# Patient Record
Sex: MALE | Race: WHITE | Employment: FULL TIME | URBAN - METROPOLITAN AREA
[De-identification: names, ages, dates, MRNs, and addresses within clinical notes are randomized per-mention and may not be internally consistent; named-entity substitution may affect disease eponyms.]

---

## 2017-08-04 ENCOUNTER — HOSPITAL ENCOUNTER (OUTPATIENT)
Age: 42
Discharge: HOME | End: 2017-08-04
Attending: PSYCHIATRY & NEUROLOGY
Payer: SELF-PAY

## 2017-08-04 DIAGNOSIS — H53.123 TRANSIENT VISUAL LOSS, BILATERAL: ICD-10-CM

## 2017-08-04 PROCEDURE — 74011250636 HC RX REV CODE- 250/636

## 2017-08-04 PROCEDURE — A9585 GADOBUTROL INJECTION: HCPCS

## 2017-08-04 PROCEDURE — 70553 MRI BRAIN STEM W/O & W/DYE: CPT

## 2017-08-04 RX ADMIN — GADOBUTROL 10 ML: 604.72 INJECTION INTRAVENOUS at 21:00

## 2018-06-25 ENCOUNTER — OFFICE VISIT (OUTPATIENT)
Dept: INTERNAL MEDICINE CLINIC | Age: 43
End: 2018-06-25

## 2018-06-25 VITALS
TEMPERATURE: 98.3 F | OXYGEN SATURATION: 97 % | HEART RATE: 72 BPM | SYSTOLIC BLOOD PRESSURE: 126 MMHG | WEIGHT: 211 LBS | DIASTOLIC BLOOD PRESSURE: 86 MMHG | HEIGHT: 71 IN | BODY MASS INDEX: 29.54 KG/M2

## 2018-06-25 DIAGNOSIS — R07.9 CHEST PAIN, UNSPECIFIED TYPE: Primary | ICD-10-CM

## 2018-06-25 RX ORDER — SUMATRIPTAN 25 MG/1
TABLET, FILM COATED ORAL
Refills: 0 | COMMUNITY
Start: 2018-04-02 | End: 2019-07-18 | Stop reason: ALTCHOICE

## 2018-06-25 RX ORDER — PANTOPRAZOLE SODIUM 40 MG/1
TABLET, DELAYED RELEASE ORAL
Qty: 30 TAB | Refills: 3 | Status: SHIPPED | OUTPATIENT
Start: 2018-06-25 | End: 2019-06-17 | Stop reason: ALTCHOICE

## 2018-06-25 RX ORDER — TOPIRAMATE 50 MG/1
TABLET, FILM COATED ORAL DAILY
Refills: 0 | COMMUNITY
Start: 2018-06-04 | End: 2019-08-15 | Stop reason: SDUPTHER

## 2018-06-26 NOTE — PROGRESS NOTES
The patient presents to the office today with the chief complaint of chest pain    HPI    The patient complains of epigastric to lower chest pain. Not exertional.  Pain is an ache. The patient has a history of reflux - although there is some radiation up his chest this pain is somewhat different. There is no associated dyspnea. The patient continues with cigarette use      Review of Systems   Respiratory: Negative for shortness of breath. Cardiovascular: Positive for chest pain. Negative for leg swelling. Allergies   Allergen Reactions    Erythromycin Anaphylaxis and Hives       Current Outpatient Prescriptions   Medication Sig Dispense Refill    topiramate (TOPAMAX) 50 mg tablet   0    SUMAtriptan (IMITREX) 25 mg tablet take 1 to 2 tablets by mouth every 6 hours if needed for ACUTE MIGRAINE  0    pantoprazole (PROTONIX) 40 mg tablet 1 tab each day 30 Tab 3    fluticasone (FLONASE) 50 mcg/actuation nasal spray nightly. Past Medical History:   Diagnosis Date    Broken leg     Bronchitis     Concussion     x3    Dislocated shoulder     Hernia     Pulmonary edema     Sterilization     Upper respiratory infection, acute        Past Surgical History:   Procedure Laterality Date    HX HERNIA REPAIR  1994    HX ORTHOPAEDIC  1980    broken left leg     HX OTHER SURGICAL      19 sutures on face    HX VASECTOMY  4/24/2015    Dr. Ronen Castillo History     Social History    Marital status: SINGLE     Spouse name: N/A    Number of children: N/A    Years of education: N/A     Occupational History    Not on file.      Social History Main Topics    Smoking status: Current Every Day Smoker     Packs/day: 0.75    Smokeless tobacco: Not on file    Alcohol use No    Drug use: Not on file    Sexual activity: Not on file     Other Topics Concern    Not on file     Social History Narrative       Patient does not have an advanced directive on file    Visit Vitals    /86 (BP 1 Location: Left arm, BP Patient Position: Standing)    Pulse 72    Temp 98.3 °F (36.8 °C) (Tympanic)    Ht 5' 11\" (1.803 m)    Wt 211 lb (95.7 kg)    SpO2 97%    BMI 29.43 kg/m2       Physical Exam  No Cervical Lymphadenopathy  No Supraclavicular Lymphadenopathy  Thyroid is Normal  Lungs are normal to percussion. Clear to auscultation   Chest wall with mild tenderness  Heart:  S1 S2 are normal, No gallops, No mummers  No Carotid Bruits  Abdomen:  Normal Bowel Sounds. No tenderness. No masses. No Hepatomegaly or Splenomegly  LE:  Strong Pedal Pulses. No Edema    . Results for orders placed or performed in visit on 06/25/18   AMB POC EKG ROUTINE W/ 12 LEADS, INTER & REP    Impression    Impression:  Normal EKG       Assessment / Plan      ICD-10-CM ICD-9-CM    1. Chest pain, unspecified type R07.9 786.50 AMB POC EKG ROUTINE W/ 12 LEADS, INTER & REP      NUCLEAR CARDIAC STRESS TEST       Stress Nuclear Test  Protonix 40 mg each AM  To ER if severe or prolonged chest pain    Follow-up Disposition:  Return in about 4 months (around 10/25/2018). I asked Mike Celis if he has any questions and I answered the questions. Mike Celis states that he understands the treatment plan and agrees with the treatment plan

## 2018-07-30 ENCOUNTER — HOSPITAL ENCOUNTER (OUTPATIENT)
Dept: NON INVASIVE DIAGNOSTICS | Age: 43
Discharge: HOME | End: 2018-07-30
Attending: INTERNAL MEDICINE
Payer: COMMERCIAL

## 2018-07-30 VITALS
SYSTOLIC BLOOD PRESSURE: 120 MMHG | WEIGHT: 211 LBS | HEIGHT: 67 IN | BODY MASS INDEX: 33.12 KG/M2 | DIASTOLIC BLOOD PRESSURE: 80 MMHG

## 2018-07-30 DIAGNOSIS — R07.9 CHEST PAIN, UNSPECIFIED TYPE: ICD-10-CM

## 2018-07-30 LAB
NUC REST EJECTION FRACTION: 65 %
STRESS BASELINE DIAS BP: 80 MMHG
STRESS BASELINE HR: 67 BPM
STRESS BASELINE SYS BP: 120 MMHG
STRESS ESTIMATED WORKLOAD: 13.4 METS
STRESS EXERCISE DUR MIN: NORMAL
STRESS PEAK DIAS BP: 70 MMHG
STRESS PEAK SYS BP: 180 MMHG
STRESS PERCENT HR ACHIEVED: 114 %
STRESS POST PEAK HR: 171 BPM
STRESS RATE PRESSURE PRODUCT: NORMAL BPM*MMHG
STRESS ST DEPRESSION: 0 MM
STRESS ST ELEVATION: 0 MM
STRESS TARGET HR: 150 BPM
TID: 1.03

## 2018-07-30 PROCEDURE — A9500 TC99M SESTAMIBI: HCPCS

## 2018-07-30 PROCEDURE — 74011000258 HC RX REV CODE- 258: Performed by: INTERNAL MEDICINE

## 2018-07-30 RX ORDER — SODIUM CHLORIDE 9 MG/ML
100 INJECTION, SOLUTION INTRAVENOUS ONCE
Status: COMPLETED | OUTPATIENT
Start: 2018-07-30 | End: 2018-07-30

## 2018-07-30 RX ADMIN — SODIUM CHLORIDE 100 ML/HR: 900 INJECTION, SOLUTION INTRAVENOUS at 09:15

## 2018-07-30 NOTE — PROGRESS NOTES
Patient was given 11.0 milliCuries of 99mTc-Sestamibi for the resting images. Patient was also given 33.0 milliCuries of 99mTc-Sestamibi for the stress images. Prone images were acquired. Patient walked on treadmill during nuclear stress test. 
 
Patient's armband was discarded and shredded.

## 2019-06-17 ENCOUNTER — HOSPITAL ENCOUNTER (OUTPATIENT)
Dept: LAB | Age: 44
Discharge: HOME | End: 2019-06-17
Payer: COMMERCIAL

## 2019-06-17 ENCOUNTER — OFFICE VISIT (OUTPATIENT)
Dept: FAMILY MEDICINE CLINIC | Facility: CLINIC | Age: 44
End: 2019-06-17

## 2019-06-17 VITALS
RESPIRATION RATE: 12 BRPM | SYSTOLIC BLOOD PRESSURE: 112 MMHG | DIASTOLIC BLOOD PRESSURE: 76 MMHG | TEMPERATURE: 96.8 F | BODY MASS INDEX: 35.82 KG/M2 | OXYGEN SATURATION: 99 % | WEIGHT: 228.2 LBS | HEIGHT: 67 IN | HEART RATE: 81 BPM

## 2019-06-17 DIAGNOSIS — F43.9 STRESS: ICD-10-CM

## 2019-06-17 DIAGNOSIS — Z00.00 ANNUAL PHYSICAL EXAM: ICD-10-CM

## 2019-06-17 DIAGNOSIS — Z00.00 ANNUAL PHYSICAL EXAM: Primary | ICD-10-CM

## 2019-06-17 DIAGNOSIS — R53.82 CHRONIC FATIGUE: ICD-10-CM

## 2019-06-17 LAB
ALBUMIN SERPL-MCNC: 4.1 G/DL (ref 3.4–5)
ALBUMIN/GLOB SERPL: 1.3 {RATIO} (ref 0.8–1.7)
ALP SERPL-CCNC: 67 U/L (ref 45–117)
ALT SERPL-CCNC: 36 U/L (ref 16–61)
ANION GAP SERPL CALC-SCNC: 10 MMOL/L (ref 3–18)
AST SERPL-CCNC: 15 U/L (ref 15–37)
BASOPHILS # BLD: 0.1 K/UL (ref 0–0.1)
BASOPHILS NFR BLD: 1 % (ref 0–2)
BILIRUB SERPL-MCNC: 0.6 MG/DL (ref 0.2–1)
BUN SERPL-MCNC: 10 MG/DL (ref 7–18)
BUN/CREAT SERPL: 12 (ref 12–20)
CALCIUM SERPL-MCNC: 9 MG/DL (ref 8.5–10.1)
CHLORIDE SERPL-SCNC: 107 MMOL/L (ref 100–108)
CHOLEST SERPL-MCNC: 225 MG/DL
CO2 SERPL-SCNC: 24 MMOL/L (ref 21–32)
CREAT SERPL-MCNC: 0.83 MG/DL (ref 0.6–1.3)
DIFFERENTIAL METHOD BLD: ABNORMAL
EOSINOPHIL # BLD: 0.3 K/UL (ref 0–0.4)
EOSINOPHIL NFR BLD: 3 % (ref 0–5)
ERYTHROCYTE [DISTWIDTH] IN BLOOD BY AUTOMATED COUNT: 14.4 % (ref 11.6–14.5)
GLOBULIN SER CALC-MCNC: 3.1 G/DL (ref 2–4)
GLUCOSE SERPL-MCNC: 74 MG/DL (ref 74–99)
HCT VFR BLD AUTO: 44.1 % (ref 36–48)
HGB BLD-MCNC: 14.7 G/DL (ref 13–16)
LYMPHOCYTES # BLD: 3.3 K/UL (ref 0.9–3.6)
LYMPHOCYTES NFR BLD: 40 % (ref 21–52)
MCH RBC QN AUTO: 31.3 PG (ref 24–34)
MCHC RBC AUTO-ENTMCNC: 33.3 G/DL (ref 31–37)
MCV RBC AUTO: 94 FL (ref 74–97)
MONOCYTES # BLD: 0.7 K/UL (ref 0.05–1.2)
MONOCYTES NFR BLD: 8 % (ref 3–10)
NEUTS SEG # BLD: 3.8 K/UL (ref 1.8–8)
NEUTS SEG NFR BLD: 48 % (ref 40–73)
PLATELET # BLD AUTO: 298 K/UL (ref 135–420)
PMV BLD AUTO: 12.5 FL (ref 9.2–11.8)
POTASSIUM SERPL-SCNC: 4.3 MMOL/L (ref 3.5–5.5)
PROT SERPL-MCNC: 7.2 G/DL (ref 6.4–8.2)
RBC # BLD AUTO: 4.69 M/UL (ref 4.7–5.5)
SODIUM SERPL-SCNC: 141 MMOL/L (ref 136–145)
TSH SERPL DL<=0.05 MIU/L-ACNC: 5.44 UIU/ML (ref 0.36–3.74)
WBC # BLD AUTO: 8.1 K/UL (ref 4.6–13.2)

## 2019-06-17 PROCEDURE — 80053 COMPREHEN METABOLIC PANEL: CPT

## 2019-06-17 PROCEDURE — 84443 ASSAY THYROID STIM HORMONE: CPT

## 2019-06-17 PROCEDURE — 36415 COLL VENOUS BLD VENIPUNCTURE: CPT

## 2019-06-17 PROCEDURE — 82465 ASSAY BLD/SERUM CHOLESTEROL: CPT

## 2019-06-17 PROCEDURE — 85025 COMPLETE CBC W/AUTO DIFF WBC: CPT

## 2019-06-17 RX ORDER — BUPROPION HYDROCHLORIDE 100 MG/1
TABLET, EXTENDED RELEASE ORAL
Qty: 60 TAB | Refills: 2 | Status: SHIPPED | OUTPATIENT
Start: 2019-06-17 | End: 2019-08-15 | Stop reason: SDUPTHER

## 2019-06-17 NOTE — PROGRESS NOTES
ROOM # 4    Lb Cancino presents today for   Chief Complaint   Patient presents with    Lethargy     Pt complains of low energy for the past month       Lb Cancino preferred language for health care discussion is english/other. Is someone accompanying this pt? no    Is the patient using any DME equipment during [address]? no    Depression Screening:  3 most recent PHQ Screens 6/17/2019 9/29/2015   Little interest or pleasure in doing things Several days Not at all   Feeling down, depressed, irritable, or hopeless Not at all Not at all   Total Score PHQ 2 1 0       Learning Assessment:  No flowsheet data found. Abuse Screening:  No flowsheet data found. Fall Risk  No flowsheet data found. Health Maintenance reviewed and discussed per provider. Yes    Lb Cancino is due for   Health Maintenance Due   Topic Date Due    Pneumococcal 0-64 years (1 of 1 - PPSV23) 08/05/1980    DTaP/Tdap/Td series (1 - Tdap) 08/05/1995    MEDICARE YEARLY EXAM  02/10/2019         Please order/place referral if appropriate. Advance Directive:  1. Do you have an advance directive in place? Patient Reply: no    2. If not, would you like material regarding how to put one in place? Patient Reply: no    Coordination of Care:  1. Have you been to the ER, urgent care clinic since your last visit? Hospitalized since your last visit? no    2. Have you seen or consulted any other health care providers outside of the [address] [place] [address] since your last visit? Include any pap smears or colon screening.  no

## 2019-06-21 NOTE — PROGRESS NOTES
The patient presents to the office today for an annual checkup    HPI    The patient presents today for an annual checkup. The patient has remained on Topamax for migraine per tension and Imitrex to treat migraines. Patient has been doing okay with his medications. The patient complains of bothersome fatigue. The patient relates this to a busy work schedule and having a lot to do around the house at home. The patient is both tired and a bit frustrated with her current state of his workload.       ROS  Positive for stress and fatigue  Negative for chest pain or shortness of breath    Allergies   Allergen Reactions    Erythromycin Anaphylaxis and Hives       Current Outpatient Medications   Medication Sig Dispense Refill    buPROPion SR (WELLBUTRIN SR) 100 mg SR tablet 1 tab each AM, 1 tab mid day 60 Tab 2    topiramate (TOPAMAX) 50 mg tablet   0    SUMAtriptan (IMITREX) 25 mg tablet take 1 to 2 tablets by mouth every 6 hours if needed for ACUTE MIGRAINE  0       Past Medical History:   Diagnosis Date    Broken leg     Bronchitis     Concussion     x3    Dislocated shoulder     Hernia     Pulmonary edema     Sterilization     Upper respiratory infection, acute        Past Surgical History:   Procedure Laterality Date    HX HERNIA REPAIR  1994    HX ORTHOPAEDIC  1980    broken left leg     HX OTHER SURGICAL      19 sutures on face    HX VASECTOMY  4/24/2015    Dr. Day Franco       Social History     Socioeconomic History    Marital status: SINGLE     Spouse name: Not on file    Number of children: Not on file    Years of education: Not on file    Highest education level: Not on file   Occupational History    Not on file   Social Needs    Financial resource strain: Not on file    Food insecurity:     Worry: Not on file     Inability: Not on file    Transportation needs:     Medical: Not on file     Non-medical: Not on file   Tobacco Use    Smoking status: Former Smoker     Packs/day: 0.75     Last attempt to quit: 3/17/2019     Years since quittin.2    Smokeless tobacco: Current User   Substance and Sexual Activity    Alcohol use: No    Drug use: Yes     Types: Marijuana    Sexual activity: Yes     Partners: Female   Lifestyle    Physical activity:     Days per week: Not on file     Minutes per session: Not on file    Stress: Not on file   Relationships    Social connections:     Talks on phone: Not on file     Gets together: Not on file     Attends Quaker service: Not on file     Active member of club or organization: Not on file     Attends meetings of clubs or organizations: Not on file     Relationship status: Not on file    Intimate partner violence:     Fear of current or ex partner: Not on file     Emotionally abused: Not on file     Physically abused: Not on file     Forced sexual activity: Not on file   Other Topics Concern    Not on file   Social History Narrative    Not on file       Patient does not have an advanced directive on file    Visit Vitals  /76   Pulse 81   Temp 96.8 °F (36 °C) (Oral)   Resp 12   Ht 5' 7\" (1.702 m)   Wt 228 lb 3.2 oz (103.5 kg)   SpO2 99%   BMI 35.74 kg/m²       Physical Exam  No Cervical Lymphadenopathy  No Supraclavicular Lymphadenopathy  Thyroid is Normal  Lungs are normal to percussion. Clear to auscultation   Heart:  S1 S2 are normal, No gallops, No murmurs  No Carotid Bruits  Abdomen:  Normal Bowel Sounds. No tenderness. No masses. No Hepatomegaly or Splenomegaly  LE:  Strong Pedal Pulses.   No Edema      BMI: The BMI is high recommended diet limited to 2000 hammad daily and 100 g carbohydrates daily    Hospital Outpatient Visit on 2019   Component Date Value Ref Range Status    WBC 2019 8.1  4.6 - 13.2 K/uL Final    RBC 2019 4.69* 4.70 - 5.50 M/uL Final    HGB 2019 14.7  13.0 - 16.0 g/dL Final    HCT 2019 44.1  36.0 - 48.0 % Final    MCV 2019 94.0  74.0 - 97.0 FL Final    [facility] 2019 31.3  24.0 - 34.0 PG Final    MCHC 06/17/2019 33.3  31.0 - 37.0 g/dL Final    RDW 06/17/2019 14.4  11.6 - 14.5 % Final    PLATELET 25/91/9355 369  135 - 420 K/uL Final    MPV 06/17/2019 12.5* 9.2 - 11.8 FL Final    NEUTROPHILS 06/17/2019 48  40 - 73 % Final    LYMPHOCYTES 06/17/2019 40  21 - 52 % Final    MONOCYTES 06/17/2019 8  3 - 10 % Final    EOSINOPHILS 06/17/2019 3  0 - 5 % Final    BASOPHILS 06/17/2019 1  0 - 2 % Final    ABS. NEUTROPHILS 06/17/2019 3.8  1.8 - 8.0 K/UL Final    ABS. LYMPHOCYTES 06/17/2019 3.3  0.9 - 3.6 K/UL Final    ABS. MONOCYTES 06/17/2019 0.7  0.05 - 1.2 K/UL Final    ABS. EOSINOPHILS 06/17/2019 0.3  0.0 - 0.4 K/UL Final    ABS. BASOPHILS 06/17/2019 0.1  0.0 - 0.1 K/UL Final    DF 06/17/2019 AUTOMATED    Final    Sodium 06/17/2019 141  136 - 145 mmol/L Final    Potassium 06/17/2019 4.3  3.5 - 5.5 mmol/L Final    Chloride 06/17/2019 107  100 - 108 mmol/L Final    CO2 06/17/2019 24  21 - 32 mmol/L Final    Anion gap 06/17/2019 10  3.0 - 18 mmol/L Final    Glucose 06/17/2019 74  74 - 99 mg/dL Final    BUN 06/17/2019 10  7.0 - 18 MG/DL Final    Creatinine 06/17/2019 0.83  0.6 - 1.3 MG/DL Final    BUN/Creatinine ratio 06/17/2019 12  12 - 20   Final    GFR est AA 06/17/2019 >60  >60 ml/min/1.73m2 Final    GFR est non-AA 06/17/2019 >60  >60 ml/min/1.73m2 Final    Comment: (NOTE)  Estimated GFR is calculated using the Modification of Diet in Renal   Disease (MDRD) Study equation, reported for both  Americans   (GFRAA) and non- Americans (GFRNA), and normalized to 1.73m2   body surface area. The physician must decide which value applies to   the patient. The MDRD study equation should only be used in   individuals age 25 or older. It has not been validated for the   following: pregnant women, patients with serious comorbid conditions,   or on certain medications, or persons with extremes of body size,   muscle mass, or nutritional status.       Calcium 06/17/2019 9.0  8.5 - 10.1 MG/DL Final    Bilirubin, total 06/17/2019 0.6  0.2 - 1.0 MG/DL Final    ALT (SGPT) 06/17/2019 36  16 - 61 U/L Final    AST (SGOT) 06/17/2019 15  15 - 37 U/L Final    Alk. phosphatase 06/17/2019 67  45 - 117 U/L Final    Protein, total 06/17/2019 7.2  6.4 - 8.2 g/dL Final    Albumin 06/17/2019 4.1  3.4 - 5.0 g/dL Final    Globulin 06/17/2019 3.1  2.0 - 4.0 g/dL Final    A-G Ratio 06/17/2019 1.3  0.8 - 1.7   Final    TSH 06/17/2019 5.44* 0.36 - 3.74 uIU/mL Final    Cholesterol, total 06/17/2019 225* <200 MG/DL Final       .  Results for orders placed or performed during the hospital encounter of 06/17/19   CBC WITH AUTOMATED DIFF   Result Value Ref Range    WBC 8.1 4.6 - 13.2 K/uL    RBC 4.69 (L) 4.70 - 5.50 M/uL    HGB 14.7 13.0 - 16.0 g/dL    HCT 44.1 36.0 - 48.0 %    MCV 94.0 74.0 - 97.0 FL    MCH 31.3 24.0 - 34.0 PG    MCHC 33.3 31.0 - 37.0 g/dL    RDW 14.4 11.6 - 14.5 %    PLATELET 246 786 - 876 K/uL    MPV 12.5 (H) 9.2 - 11.8 FL    NEUTROPHILS 48 40 - 73 %    LYMPHOCYTES 40 21 - 52 %    MONOCYTES 8 3 - 10 %    EOSINOPHILS 3 0 - 5 %    BASOPHILS 1 0 - 2 %    ABS. NEUTROPHILS 3.8 1.8 - 8.0 K/UL    ABS. LYMPHOCYTES 3.3 0.9 - 3.6 K/UL    ABS. MONOCYTES 0.7 0.05 - 1.2 K/UL    ABS. EOSINOPHILS 0.3 0.0 - 0.4 K/UL    ABS. BASOPHILS 0.1 0.0 - 0.1 K/UL    DF AUTOMATED     METABOLIC PANEL, COMPREHENSIVE   Result Value Ref Range    Sodium 141 136 - 145 mmol/L    Potassium 4.3 3.5 - 5.5 mmol/L    Chloride 107 100 - 108 mmol/L    CO2 24 21 - 32 mmol/L    Anion gap 10 3.0 - 18 mmol/L    Glucose 74 74 - 99 mg/dL    BUN 10 7.0 - 18 MG/DL    Creatinine 0.83 0.6 - 1.3 MG/DL    BUN/Creatinine ratio 12 12 - 20      GFR est AA >60 >60 ml/min/1.73m2    GFR est non-AA >60 >60 ml/min/1.73m2    Calcium 9.0 8.5 - 10.1 MG/DL    Bilirubin, total 0.6 0.2 - 1.0 MG/DL    ALT (SGPT) 36 16 - 61 U/L    AST (SGOT) 15 15 - 37 U/L    Alk.  phosphatase 67 45 - 117 U/L    Protein, total 7.2 6.4 - 8.2 g/dL Albumin 4.1 3.4 - 5.0 g/dL    Globulin 3.1 2.0 - 4.0 g/dL    A-G Ratio 1.3 0.8 - 1.7     TSH 3RD GENERATION   Result Value Ref Range    TSH 5.44 (H) 0.36 - 3.74 uIU/mL   CHOLESTEROL, TOTAL   Result Value Ref Range    Cholesterol, total 225 (H) <200 MG/DL       Assessment / Plan      ICD-10-CM ICD-9-CM    1. Annual physical exam Z00.00 V70.0 CBC WITH AUTOMATED DIFF      METABOLIC PANEL, COMPREHENSIVE      CHOLESTEROL, TOTAL   2. Chronic fatigue R53.82 780.79 TSH 3RD GENERATION   3. Stress F43.9 V62.89 CBC WITH AUTOMATED DIFF      METABOLIC PANEL, COMPREHENSIVE       Labs ordered  Add Wellbutrin  he was advised to continue his maintenance medications  I counseled the patient as to the stressors in his life      Follow-up and Dispositions    · Return in about 1 month (around 7/16/2019). I asked Orvan Hammans. Diggs if he has any questions and I answered the questions. Orvan Hammans. Diggs states that he understands the treatment plan and agrees with the treatment plan          THIS DOCUMENT WAS [id number].   IT MAY CONTAIN TRANSCRIPTION ERRORS

## 2019-07-16 ENCOUNTER — OFFICE VISIT (OUTPATIENT)
Dept: FAMILY MEDICINE CLINIC | Facility: CLINIC | Age: 44
End: 2019-07-16

## 2019-07-16 VITALS
HEIGHT: 67 IN | BODY MASS INDEX: 35.74 KG/M2 | OXYGEN SATURATION: 98 % | DIASTOLIC BLOOD PRESSURE: 80 MMHG | TEMPERATURE: 97.6 F | SYSTOLIC BLOOD PRESSURE: 140 MMHG | HEART RATE: 96 BPM

## 2019-07-16 DIAGNOSIS — F43.9 STRESS: ICD-10-CM

## 2019-07-16 DIAGNOSIS — G43.009 MIGRAINE WITHOUT AURA AND WITHOUT STATUS MIGRAINOSUS, NOT INTRACTABLE: Primary | ICD-10-CM

## 2019-07-16 NOTE — PROGRESS NOTES
Latoya Tarango presents today for   Chief Complaint   Patient presents with    Well Male    Mass     left arm pit       Latoya Fillers preferred language for health care discussion is english. Is someone accompanying this pt? no    Is the patient using any DME equipment during [address]? no    Depression Screening:  3 most recent PHQ Screens 6/17/2019   Little interest or pleasure in doing things Several days   Feeling down, depressed, irritable, or hopeless Not at all   Total Score PHQ 2 1       Learning Assessment:  No flowsheet data found. Abuse Screening:  No flowsheet data found. Health Maintenance reviewed and discussed and ordered per Provider. Health Maintenance Due   Topic Date Due    DTaP/Tdap/Td series (1 - Tdap) 08/05/1995    MEDICARE YEARLY EXAM  02/10/2019   . Latyoa Fillers is updated on all     Pt currently taking Antiplatelet therapy? no    Coordination of Care:  1. Have you been to the ER, urgent care clinic since your last visit? no  Hospitalized since your last visit? no    2. Have you seen or consulted any other health care providers outside of the [address] [place] [address] since your last visit? no Include any pap smears or colon screening.  no

## 2019-07-18 NOTE — PROGRESS NOTES
The patient presents to the office today with the chief complaint of migraines    HPI    The patient has a history of migraines. These had been quiet but recently the patient had a relapse with several migraines. The patient remains on Topamax for prophylaxis. The patient is stressed - mostly over issues in his marriage. He is on Wellbutrin but he feels that this is \"not enough. \"      Review of Systems   Respiratory: Negative for shortness of breath. Cardiovascular: Negative for chest pain. Neurological: Positive for headaches. Allergies   Allergen Reactions    Erythromycin Anaphylaxis and Hives       Current Outpatient Medications   Medication Sig Dispense Refill    buPROPion SR (WELLBUTRIN SR) 100 mg SR tablet 1 tab each AM, 1 tab mid day 60 Tab 2    topiramate (TOPAMAX) 50 mg tablet daily.   0       Past Medical History:   Diagnosis Date    Broken leg     Bronchitis     Concussion     x3    Dislocated shoulder     Hernia     Pulmonary edema     Sterilization     Upper respiratory infection, acute        Past Surgical History:   Procedure Laterality Date    HX HERNIA REPAIR      HX ORTHOPAEDIC  1980    broken left leg     HX OTHER SURGICAL      19 sutures on face    HX VASECTOMY  2015    Dr. Aleja Block History     Socioeconomic History    Marital status: SINGLE     Spouse name: Not on file    Number of children: Not on file    Years of education: Not on file    Highest education level: Not on file   Occupational History    Not on file   Social Needs    Financial resource strain: Not on file    Food insecurity:     Worry: Not on file     Inability: Not on file    Transportation needs:     Medical: Not on file     Non-medical: Not on file   Tobacco Use    Smoking status: Former Smoker     Packs/day: 0.75     Last attempt to quit: 3/17/2019     Years since quittin.3    Smokeless tobacco: Current User   Substance and Sexual Activity    Alcohol use: No    Drug use: Yes     Types: Marijuana    Sexual activity: Yes     Partners: Female   Lifestyle    Physical activity:     Days per week: Not on file     Minutes per session: Not on file    Stress: Not on file   Relationships    Social connections:     Talks on phone: Not on file     Gets together: Not on file     Attends Pentecostalism service: Not on file     Active member of club or organization: Not on file     Attends meetings of clubs or organizations: Not on file     Relationship status: Not on file    Intimate partner violence:     Fear of current or ex partner: Not on file     Emotionally abused: Not on file     Physically abused: Not on file     Forced sexual activity: Not on file   Other Topics Concern    Not on file   Social History Narrative    Not on file       Patient does not have an advanced directive on file    Visit Vitals  /80 (BP 1 Location: Left arm, BP Patient Position: Sitting)   Pulse 96   Temp 97.6 °F (36.4 °C) (Tympanic)   Ht 5' 7\" (1.702 m)   SpO2 98%   BMI 35.74 kg/m²       Physical Exam  No Cervical Lymphadenopathy  No Supraclavicular Lymphadenopathy  Thyroid is Normal  Lungs are normal to percussion. Clear to auscultation   Heart:  S1 S2 are normal, No gallops, No murmurs  No Carotid Bruits  Abdomen:  Normal Bowel Sounds. No tenderness. No masses. No Hepatomegaly or Splenomegaly  LE:  Strong Pedal Pulses.   No Edema      BMI:  The patient was advised to limit calories to 2000 hammad and carbohydrates to 100 grams daily      Hospital Outpatient Visit on 06/17/2019   Component Date Value Ref Range Status    WBC 06/17/2019 8.1  4.6 - 13.2 K/uL Final    RBC 06/17/2019 4.69* 4.70 - 5.50 M/uL Final    HGB 06/17/2019 14.7  13.0 - 16.0 g/dL Final    HCT 06/17/2019 44.1  36.0 - 48.0 % Final    MCV 06/17/2019 94.0  74.0 - 97.0 FL Final    MCH 06/17/2019 31.3  24.0 - 34.0 PG Final    MCHC 06/17/2019 33.3  31.0 - 37.0 g/dL Final    RDW 06/17/2019 14.4  11.6 - 14.5 % Final    PLATELET 31/57/9890 341  135 - 420 K/uL Final    MPV 06/17/2019 12.5* 9.2 - 11.8 FL Final    NEUTROPHILS 06/17/2019 48  40 - 73 % Final    LYMPHOCYTES 06/17/2019 40  21 - 52 % Final    MONOCYTES 06/17/2019 8  3 - 10 % Final    EOSINOPHILS 06/17/2019 3  0 - 5 % Final    BASOPHILS 06/17/2019 1  0 - 2 % Final    ABS. NEUTROPHILS 06/17/2019 3.8  1.8 - 8.0 K/UL Final    ABS. LYMPHOCYTES 06/17/2019 3.3  0.9 - 3.6 K/UL Final    ABS. MONOCYTES 06/17/2019 0.7  0.05 - 1.2 K/UL Final    ABS. EOSINOPHILS 06/17/2019 0.3  0.0 - 0.4 K/UL Final    ABS. BASOPHILS 06/17/2019 0.1  0.0 - 0.1 K/UL Final    DF 06/17/2019 AUTOMATED    Final    Sodium 06/17/2019 141  136 - 145 mmol/L Final    Potassium 06/17/2019 4.3  3.5 - 5.5 mmol/L Final    Chloride 06/17/2019 107  100 - 108 mmol/L Final    CO2 06/17/2019 24  21 - 32 mmol/L Final    Anion gap 06/17/2019 10  3.0 - 18 mmol/L Final    Glucose 06/17/2019 74  74 - 99 mg/dL Final    BUN 06/17/2019 10  7.0 - 18 MG/DL Final    Creatinine 06/17/2019 0.83  0.6 - 1.3 MG/DL Final    BUN/Creatinine ratio 06/17/2019 12  12 - 20   Final    GFR est AA 06/17/2019 >60  >60 ml/min/1.73m2 Final    GFR est non-AA 06/17/2019 >60  >60 ml/min/1.73m2 Final    Comment: (NOTE)  Estimated GFR is calculated using the Modification of Diet in Renal   Disease (MDRD) Study equation, reported for both  Americans   (GFRAA) and non- Americans (GFRNA), and normalized to 1.73m2   body surface area. The physician must decide which value applies to   the patient. The MDRD study equation should only be used in   individuals age 25 or older. It has not been validated for the   following: pregnant women, patients with serious comorbid conditions,   or on certain medications, or persons with extremes of body size,   muscle mass, or nutritional status.       Calcium 06/17/2019 9.0  8.5 - 10.1 MG/DL Final    Bilirubin, total 06/17/2019 0.6  0.2 - 1.0 MG/DL Final    ALT (SGPT) 06/17/2019 36  16 - 61 U/L Final    AST (SGOT) 06/17/2019 15  15 - 37 U/L Final    Alk. phosphatase 06/17/2019 67  45 - 117 U/L Final    Protein, total 06/17/2019 7.2  6.4 - 8.2 g/dL Final    Albumin 06/17/2019 4.1  3.4 - 5.0 g/dL Final    Globulin 06/17/2019 3.1  2.0 - 4.0 g/dL Final    A-G Ratio 06/17/2019 1.3  0.8 - 1.7   Final    TSH 06/17/2019 5.44* 0.36 - 3.74 uIU/mL Final    Cholesterol, total 06/17/2019 225* <200 MG/DL Final       .No results found for any visits on 07/16/19. Assessment / Plan      ICD-10-CM ICD-9-CM    1. Migraine without aura and without status migrainosus, not intractable G43.009 346.10    2. Stress F43.9 V62.89        Increase Topamax to 50 mg BID  Increase Wellbutrin to 150 mg BID    Follow-up and Dispositions    · Return in about 4 months (around 11/16/2019). I asked Gagandeep Celis if he has any questions and I answered the questions. Gagandeep Celis states that he understands the treatment plan and agrees with the treatment plan          THIS DOCUMENT  [facility] [address].   IT MAY CONTAIN TRANSCRIPTION ERRORS

## 2019-08-15 RX ORDER — BUPROPION HYDROCHLORIDE 100 MG/1
TABLET, EXTENDED RELEASE ORAL
Qty: 60 TAB | Refills: 2 | Status: SHIPPED | OUTPATIENT
Start: 2019-08-15 | End: 2019-10-28 | Stop reason: SDUPTHER

## 2019-08-15 RX ORDER — TOPIRAMATE 50 MG/1
50 TABLET, FILM COATED ORAL 2 TIMES DAILY
Qty: 60 TAB | Refills: 2 | Status: SHIPPED | OUTPATIENT
Start: 2019-08-15 | End: 2019-10-28 | Stop reason: SDUPTHER

## 2019-08-15 RX ORDER — SILDENAFIL 100 MG/1
TABLET, FILM COATED ORAL
Qty: 30 TAB | Refills: 1 | Status: SHIPPED | OUTPATIENT
Start: 2019-08-15

## 2019-08-15 NOTE — TELEPHONE ENCOUNTER
Last seen 19  Next appt  10/08/19    Requested Prescriptions     Pending Prescriptions Disp Refills    topiramate (TOPAMAX) 50 mg tablet  0     Sig: daily.     buPROPion SR (WELLBUTRIN SR) 100 mg SR tablet 60 Tab 2     Si tab each AM, 1 tab mid day

## 2019-10-28 ENCOUNTER — OFFICE VISIT (OUTPATIENT)
Dept: FAMILY MEDICINE CLINIC | Facility: CLINIC | Age: 44
End: 2019-10-28

## 2019-10-28 VITALS
BODY MASS INDEX: 36.26 KG/M2 | HEART RATE: 99 BPM | TEMPERATURE: 98.6 F | SYSTOLIC BLOOD PRESSURE: 121 MMHG | RESPIRATION RATE: 20 BRPM | HEIGHT: 67 IN | DIASTOLIC BLOOD PRESSURE: 89 MMHG | OXYGEN SATURATION: 96 % | WEIGHT: 231 LBS

## 2019-10-28 DIAGNOSIS — R53.82 CHRONIC FATIGUE: Primary | ICD-10-CM

## 2019-10-28 DIAGNOSIS — G43.009 MIGRAINE WITHOUT AURA AND WITHOUT STATUS MIGRAINOSUS, NOT INTRACTABLE: ICD-10-CM

## 2019-10-28 RX ORDER — TOPIRAMATE 50 MG/1
50 TABLET, FILM COATED ORAL 2 TIMES DAILY
Qty: 180 TAB | Refills: 2 | Status: SHIPPED | OUTPATIENT
Start: 2019-10-28

## 2019-10-28 RX ORDER — BUPROPION HYDROCHLORIDE 100 MG/1
TABLET, EXTENDED RELEASE ORAL
Qty: 180 TAB | Refills: 2 | Status: SHIPPED | OUTPATIENT
Start: 2019-10-28

## 2019-10-28 NOTE — PROGRESS NOTES
Chief Complaint   Patient presents with    Follow-up     Headaches and Stress     Medication Refill

## 2019-10-30 NOTE — PROGRESS NOTES
The patient presents to the office today with the chief complaint of fatigue    HPI    The patient remains on Wellbutrin and Topamax for migraines and chronic fatigue. He is doing ok on the therapy. He has no complaints. Review of Systems   Respiratory: Negative for shortness of breath. Cardiovascular: Negative for chest pain and leg swelling. Allergies   Allergen Reactions    Erythromycin Anaphylaxis and Hives       Current Outpatient Medications   Medication Sig Dispense Refill    topiramate (TOPAMAX) 50 mg tablet Take 1 Tab by mouth two (2) times a day.  180 Tab 2    buPROPion SR (WELLBUTRIN SR) 100 mg SR tablet 1 tab each AM, 1 tab mid day 180 Tab 2    sildenafil citrate (VIAGRA) 100 mg tablet 1 tab 45 min before 30 Tab 1       Past Medical History:   Diagnosis Date    Broken leg     Bronchitis     Concussion     x3    Dislocated shoulder     Hernia     Pulmonary edema     Sterilization     Upper respiratory infection, acute        Past Surgical History:   Procedure Laterality Date    HX HERNIA REPAIR      HX ORTHOPAEDIC  1980    broken left leg     HX OTHER SURGICAL      19 sutures on face    HX VASECTOMY  2015    Dr. Chelsey Atkinson History     Socioeconomic History    Marital status: SINGLE     Spouse name: Not on file    Number of children: Not on file    Years of education: Not on file    Highest education level: Not on file   Occupational History    Not on file   Social Needs    Financial resource strain: Not on file    Food insecurity:     Worry: Not on file     Inability: Not on file    Transportation needs:     Medical: Not on file     Non-medical: Not on file   Tobacco Use    Smoking status: Former Smoker     Packs/day: 0.75     Last attempt to quit: 3/17/2019     Years since quittin.6    Smokeless tobacco: Current User   Substance and Sexual Activity    Alcohol use: No    Drug use: Yes     Types: Marijuana    Sexual activity: Yes     Partners: Female   Lifestyle    Physical activity:     Days per week: Not on file     Minutes per session: Not on file    Stress: Not on file   Relationships    Social connections:     Talks on phone: Not on file     Gets together: Not on file     Attends Christianity service: Not on file     Active member of club or organization: Not on file     Attends meetings of clubs or organizations: Not on file     Relationship status: Not on file    Intimate partner violence:     Fear of current or ex partner: Not on file     Emotionally abused: Not on file     Physically abused: Not on file     Forced sexual activity: Not on file   Other Topics Concern    Not on file   Social History Narrative    Not on file       Patient does not have an advanced directive on file    Visit Vitals  /89 (BP 1 Location: Right arm, BP Patient Position: Sitting)   Pulse 99   Temp 98.6 °F (37 °C) (Oral)   Resp 20   Ht 5' 7\" (1.702 m)   Wt 231 lb (104.8 kg)   SpO2 96%   BMI 36.18 kg/m²       Physical Exam  No Cervical Lymphadenopathy  No Supraclavicular Lymphadenopathy  Thyroid is Normal  Lungs are normal to percussion. Clear to auscultation   Heart:  S1 S2 are normal, No gallops, No murmurs  No Carotid Bruits  Abdomen:  Normal Bowel Sounds. No tenderness. No masses. No Hepatomegaly or Splenomegaly  LE:  Strong Pedal Pulses. No Edema        Lab Only on 08/15/2019   Component Date Value Ref Range Status    T4, Free 08/15/2019 0.97  0.82 - 1.77 ng/dL Final    TSH 08/15/2019 3.340  0.450 - 4.500 uIU/mL Final       .No results found for any visits on 10/28/19. Assessment / Plan      ICD-10-CM ICD-9-CM    1. Chronic fatigue R53.82 780.79    2. Migraine without aura and without status migrainosus, not intractable G43.009 346.10        he was advised to continue his maintenance medications      Follow-up and Dispositions    · Return in about 5 months (around 3/25/2020). I asked Giacomo Celis if he has any questions and I answered the questions. Bala Celis states that he understands the treatment plan and agrees with the treatment plan          THIS DOCUMENT  [facility].   IT MAY CONTAIN TRANSCRIPTION ERRORS

## 2021-03-16 ENCOUNTER — APPOINTMENT (OUTPATIENT)
Dept: ULTRASOUND IMAGING | Age: 46
End: 2021-03-16
Attending: EMERGENCY MEDICINE
Payer: COMMERCIAL

## 2021-03-16 ENCOUNTER — HOSPITAL ENCOUNTER (EMERGENCY)
Age: 46
Discharge: HOME | End: 2021-03-16
Attending: EMERGENCY MEDICINE
Payer: COMMERCIAL

## 2021-03-16 VITALS
HEIGHT: 71 IN | HEART RATE: 74 BPM | TEMPERATURE: 98.1 F | RESPIRATION RATE: 18 BRPM | OXYGEN SATURATION: 99 % | WEIGHT: 230 LBS | DIASTOLIC BLOOD PRESSURE: 96 MMHG | SYSTOLIC BLOOD PRESSURE: 125 MMHG | BODY MASS INDEX: 32.2 KG/M2

## 2021-03-16 DIAGNOSIS — N45.1 EPIDIDYMITIS, RIGHT: Primary | ICD-10-CM

## 2021-03-16 LAB
APPEARANCE UR: CLEAR
BILIRUB UR QL: NEGATIVE
COLOR UR: YELLOW
GLUCOSE UR STRIP.AUTO-MCNC: NEGATIVE MG/DL
HGB UR QL STRIP: NEGATIVE
KETONES UR QL STRIP.AUTO: NEGATIVE MG/DL
LEUKOCYTE ESTERASE UR QL STRIP.AUTO: NEGATIVE
NITRITE UR QL STRIP.AUTO: NEGATIVE
PH UR STRIP: 5.5 [PH] (ref 5–8)
PROT UR STRIP-MCNC: NEGATIVE MG/DL
SP GR UR REFRACTOMETRY: 1.02 (ref 1–1.03)
UROBILINOGEN UR QL STRIP.AUTO: 1 EU/DL (ref 0.2–1)

## 2021-03-16 PROCEDURE — 76870 US EXAM SCROTUM: CPT

## 2021-03-16 PROCEDURE — 99283 EMERGENCY DEPT VISIT LOW MDM: CPT

## 2021-03-16 PROCEDURE — 81003 URINALYSIS AUTO W/O SCOPE: CPT

## 2021-03-16 RX ORDER — SUMATRIPTAN 100 MG/1
100 TABLET, FILM COATED ORAL
COMMUNITY

## 2021-03-16 RX ORDER — LEVOFLOXACIN 500 MG/1
500 TABLET, FILM COATED ORAL DAILY
Qty: 10 TAB | Refills: 0 | Status: SHIPPED | OUTPATIENT
Start: 2021-03-16

## 2021-03-16 NOTE — ED NOTES
Pt aware that awaiting ultrasound tech arrival, pt verbalized understanding. Pt denies any needs from RN at this time. Pt denies any other symptoms other than pain at this time.

## 2021-03-16 NOTE — ED PROVIDER NOTES
HPI patient describes a 2-day history of pain in the right testicle. Describes as a sharp stabbing type pain is located at the base of the right testicle. He denies any scrotal swelling. He denies any trauma to the area. He denies any changes in bowel or bladder habits. He says the pain was getting worse last night made it difficult for him to sleep and this prompted him to come the emergency room today. No other complaints given at this time.   Past Medical History:   Diagnosis Date    Broken leg     Bronchitis     Concussion     x3    Dislocated shoulder     Hernia     Migraine     Ocular migraine     Pulmonary edema     Sterilization     Upper respiratory infection, acute        Past Surgical History:   Procedure Laterality Date    HX HERNIA REPAIR      HX ORTHOPAEDIC  1980    broken left leg     HX OTHER SURGICAL      19 sutures on face    HX VASECTOMY  2015    Dr. Pilar Almaguer         Family History:   Adopted: Yes   Family history unknown: Yes       Social History     Socioeconomic History    Marital status:      Spouse name: Not on file    Number of children: Not on file    Years of education: Not on file    Highest education level: Not on file   Occupational History    Not on file   Social Needs    Financial resource strain: Not on file    Food insecurity     Worry: Not on file     Inability: Not on file    Transportation needs     Medical: Not on file     Non-medical: Not on file   Tobacco Use    Smoking status: Former Smoker     Packs/day: 0.75     Quit date: 3/17/2019     Years since quittin.0    Smokeless tobacco: Current User   Substance and Sexual Activity    Alcohol use: No    Drug use: Yes     Types: Marijuana    Sexual activity: Yes     Partners: Female   Lifestyle    Physical activity     Days per week: Not on file     Minutes per session: Not on file    Stress: Not on file   Relationships    Social connections     Talks on phone: Not on file     Gets together: Not on file     Attends Hoahaoism service: Not on file     Active member of club or organization: Not on file     Attends meetings of clubs or organizations: Not on file     Relationship status: Not on file    Intimate partner violence     Fear of current or ex partner: Not on file     Emotionally abused: Not on file     Physically abused: Not on file     Forced sexual activity: Not on file   Other Topics Concern    Not on file   Social History Narrative    Not on file         ALLERGIES: Erythromycin    Review of Systems   Constitutional: Negative. HENT: Negative. Eyes: Negative. Respiratory: Negative. Cardiovascular: Negative. Gastrointestinal: Negative. Genitourinary: Positive for testicular pain. Musculoskeletal: Negative. Neurological: Negative. Psychiatric/Behavioral: Negative. Vitals:    03/16/21 0859   BP: (!) 125/96   Pulse: 74   Resp: 18   Temp: 98.1 °F (36.7 °C)   SpO2: 99%   Weight: 104.3 kg (230 lb)   Height: 5' 11\" (1.803 m)            Physical Exam  Vitals signs and nursing note reviewed. Constitutional:       Appearance: He is well-developed. HENT:      Head: Normocephalic and atraumatic. Eyes:      Pupils: Pupils are equal, round, and reactive to light. Neck:      Musculoskeletal: Neck supple. Cardiovascular:      Rate and Rhythm: Normal rate and regular rhythm. Pulmonary:      Effort: Pulmonary effort is normal.      Breath sounds: Normal breath sounds. Abdominal:      Palpations: Abdomen is soft. Genitourinary:     Penis: Normal.       Comments: Right scrotum is slightly cobb compared to the left. Patient complains of point tenderness to the base of the right testicle. Scrotum is otherwise normal with no skin changes noted. Musculoskeletal: Normal range of motion. Skin:     General: Skin is warm and dry. Neurological:      Mental Status: He is alert and oriented to person, place, and time.           MDM Procedures

## 2021-03-16 NOTE — ED TRIAGE NOTES
Patient states being advised to report to ER by Dr. Rizwana Lee related to right groin pain. Patient states possible epididymitis. He states right groin pain x 2 days. C/o mild testicular swelling.

## 2021-09-02 ENCOUNTER — TRANSCRIBE ORDER (OUTPATIENT)
Dept: SCHEDULING | Age: 46
End: 2021-09-02

## 2021-09-02 DIAGNOSIS — K40.90 INGUINAL HERNIA: Primary | ICD-10-CM

## 2021-09-02 DIAGNOSIS — N45.1 EPIDIDYMITIS: ICD-10-CM

## 2021-09-02 DIAGNOSIS — N45.1 ACUTE EPIDIDYMITIS: ICD-10-CM

## 2021-09-02 DIAGNOSIS — K40.90 UNILATERAL INGUINAL HERNIA, WITHOUT OBSTRUCTION OR GANGRENE, NOT SPECIFIED AS RECURRENT: Primary | ICD-10-CM

## 2021-09-07 ENCOUNTER — HOSPITAL ENCOUNTER (OUTPATIENT)
Dept: ULTRASOUND IMAGING | Age: 46
Discharge: HOME | End: 2021-09-07
Attending: INTERNAL MEDICINE
Payer: COMMERCIAL

## 2021-09-07 DIAGNOSIS — K40.90 INGUINAL HERNIA: ICD-10-CM

## 2021-09-07 DIAGNOSIS — N45.1 EPIDIDYMITIS: ICD-10-CM

## 2021-09-07 DIAGNOSIS — N45.1 ACUTE EPIDIDYMITIS: ICD-10-CM

## 2021-09-07 DIAGNOSIS — K40.90 UNILATERAL INGUINAL HERNIA, WITHOUT OBSTRUCTION OR GANGRENE, NOT SPECIFIED AS RECURRENT: ICD-10-CM

## 2021-09-07 PROCEDURE — 76770 US EXAM ABDO BACK WALL COMP: CPT

## 2021-09-07 PROCEDURE — 93975 VASCULAR STUDY: CPT

## 2021-10-19 ENCOUNTER — TRANSCRIBE ORDER (OUTPATIENT)
Dept: SCHEDULING | Age: 46
End: 2021-10-19

## 2021-10-19 DIAGNOSIS — R19.00 ABDOMINAL LUMP: Primary | ICD-10-CM

## 2021-10-28 ENCOUNTER — HOSPITAL ENCOUNTER (OUTPATIENT)
Dept: CT IMAGING | Age: 46
Discharge: HOME | End: 2021-10-28
Attending: INTERNAL MEDICINE
Payer: COMMERCIAL

## 2021-10-28 DIAGNOSIS — R19.00 ABDOMINAL LUMP: ICD-10-CM

## 2021-10-28 PROCEDURE — 74011000636 HC RX REV CODE- 636: Performed by: INTERNAL MEDICINE

## 2021-10-28 PROCEDURE — 74177 CT ABD & PELVIS W/CONTRAST: CPT

## 2021-10-28 RX ADMIN — IOPAMIDOL 100 ML: 612 INJECTION, SOLUTION INTRAVENOUS at 08:30

## 2022-11-23 ENCOUNTER — TRANSCRIBE ORDER (OUTPATIENT)
Dept: SCHEDULING | Age: 47
End: 2022-11-23

## 2022-11-23 DIAGNOSIS — R10.9 UNSPECIFIED ABDOMINAL PAIN: Primary | ICD-10-CM

## 2024-01-15 ENCOUNTER — OFFICE VISIT (OUTPATIENT)
Age: 49
End: 2024-01-15
Payer: COMMERCIAL

## 2024-01-15 VITALS
HEIGHT: 71 IN | TEMPERATURE: 98 F | RESPIRATION RATE: 18 BRPM | DIASTOLIC BLOOD PRESSURE: 82 MMHG | WEIGHT: 225 LBS | OXYGEN SATURATION: 100 % | HEART RATE: 63 BPM | SYSTOLIC BLOOD PRESSURE: 137 MMHG | BODY MASS INDEX: 31.5 KG/M2

## 2024-01-15 DIAGNOSIS — K64.0 GRADE I HEMORRHOIDS: Primary | ICD-10-CM

## 2024-01-15 PROCEDURE — 46600 DIAGNOSTIC ANOSCOPY SPX: CPT | Performed by: COLON & RECTAL SURGERY

## 2024-01-15 PROCEDURE — 99203 OFFICE O/P NEW LOW 30 MIN: CPT | Performed by: COLON & RECTAL SURGERY

## 2024-01-15 NOTE — PROGRESS NOTES
HPI: Yoseph Stephens is a 48 y.o. male presenting with chief complain of possible hemorrhoids.  He developed severe rectal pain around the years.  This is improved.  He also saw some blood when wiping.  He has urgency.  Denies incontinence.  He moves his bowels 2-3 times per day.  Takes the Metamucil twice per day.  He had a colonoscopy in  where several polyps were identified.    Past Medical History:   Diagnosis Date    Broken leg     Bronchitis     Concussion     x3    Dislocated shoulder     GERD (gastroesophageal reflux disease)     Narrow stools, excessive gas    Hernia     Migraine     Ocular migraine     Pulmonary edema     Sterilization     Upper respiratory infection, acute        Past Surgical History:   Procedure Laterality Date    COLONOSCOPY  2022    hx of polyps    HERNIA REPAIR      ORTHOPEDIC SURGERY      broken left leg     OTHER SURGICAL HISTORY      19 sutures on face    VASECTOMY  2015    Dr. Davide Lopes       Family History   Adopted: Yes   Problem Relation Age of Onset    Unknown Father     Unknown Mother        Social History     Socioeconomic History    Marital status:      Spouse name: None    Number of children: None    Years of education: None    Highest education level: None   Tobacco Use    Smoking status: Former     Current packs/day: 0.00     Types: Cigarettes     Quit date: 3/17/2019     Years since quittin.8    Smokeless tobacco: Current   Substance and Sexual Activity    Alcohol use: No    Drug use: Yes     Frequency: 1.0 times per week     Types: Marijuana (Weed)    Sexual activity: Yes     Partners: Female       Current Outpatient Medications   Medication Instructions    buPROPion (WELLBUTRIN SR) 100 MG extended release tablet 1 tab each AM, 1 tab mid day    levoFLOXacin (LEVAQUIN) 500 mg, Oral, DAILY    sildenafil (VIAGRA) 100 MG tablet 1 tab 45 min before    SUMAtriptan (IMITREX) 100 mg, Oral, ONCE PRN    topiramate (TOPAMAX) 50 mg, Oral,